# Patient Record
Sex: FEMALE | Race: BLACK OR AFRICAN AMERICAN | NOT HISPANIC OR LATINO | Employment: UNEMPLOYED | URBAN - NONMETROPOLITAN AREA
[De-identification: names, ages, dates, MRNs, and addresses within clinical notes are randomized per-mention and may not be internally consistent; named-entity substitution may affect disease eponyms.]

---

## 2017-01-20 ENCOUNTER — TELEPHONE (OUTPATIENT)
Dept: OBSTETRICS AND GYNECOLOGY | Facility: CLINIC | Age: 22
End: 2017-01-20

## 2017-01-20 ENCOUNTER — OFFICE VISIT (OUTPATIENT)
Dept: OBSTETRICS AND GYNECOLOGY | Facility: CLINIC | Age: 22
End: 2017-01-20

## 2017-01-20 VITALS
SYSTOLIC BLOOD PRESSURE: 112 MMHG | BODY MASS INDEX: 40.49 KG/M2 | WEIGHT: 258 LBS | HEART RATE: 104 BPM | DIASTOLIC BLOOD PRESSURE: 75 MMHG | HEIGHT: 67 IN

## 2017-01-20 DIAGNOSIS — N92.0 MENORRHAGIA WITH REGULAR CYCLE: Primary | ICD-10-CM

## 2017-01-20 DIAGNOSIS — N94.6 DYSMENORRHEA: ICD-10-CM

## 2017-01-20 DIAGNOSIS — Z30.016 ENCOUNTER FOR INITIAL PRESCRIPTION OF TRANSDERMAL PATCH HORMONAL CONTRACEPTIVE DEVICE: ICD-10-CM

## 2017-01-20 DIAGNOSIS — Z11.3 SCREEN FOR SEXUALLY TRANSMITTED DISEASES: ICD-10-CM

## 2017-01-20 PROCEDURE — 99203 OFFICE O/P NEW LOW 30 MIN: CPT | Performed by: NURSE PRACTITIONER

## 2017-01-20 RX ORDER — METRONIDAZOLE 500 MG/1
500 TABLET ORAL 2 TIMES DAILY
Qty: 14 TABLET | Refills: 0 | Status: SHIPPED | OUTPATIENT
Start: 2017-01-20 | End: 2017-01-27

## 2017-01-20 RX ORDER — FLUCONAZOLE 150 MG/1
TABLET ORAL
Qty: 2 TABLET | Refills: 0 | Status: SHIPPED | OUTPATIENT
Start: 2017-01-20

## 2017-01-20 NOTE — PROGRESS NOTES
Subjective   Moni Bianchi is a 21 y.o. G0 here with her foster guardian, Elida, to have an annual exam. She's not had a pap smear of exam in several years and is concerned about vaginal itching and odor, as well as having heavy periods that are uncomfortable. She has developmental delays    Gynecologic Exam   The patient's primary symptoms include genital itching, a genital odor and vaginal discharge. The patient's pertinent negatives include no genital lesions, genital rash, missed menses, pelvic pain or vaginal bleeding. This is a recurrent problem. The current episode started more than 1 month ago. The problem occurs intermittently. The problem has been unchanged. The patient is experiencing no pain. Pertinent negatives include no abdominal pain, dysuria, headaches, nausea, rash or vomiting. The vaginal discharge was malodorous. There has been no bleeding. Nothing aggravates the symptoms. She has tried nothing for the symptoms. She is not sexually active (Has been in the past but not recently). No, her partner does not have an STD. She uses nothing for contraception. Her menstrual history has been regular. Her past medical history is significant for menorrhagia. There is no history of an abdominal surgery, a  section, an ectopic pregnancy, miscarriage, ovarian cysts, an STD, a terminated pregnancy or vaginosis.   Reports having a normal pap 2-3 years ago.  LMP ~1 month ago; cramping now but not bleeding yet.      The following portions of the patient's history were reviewed and updated as appropriate: allergies, current medications, past family history, past medical history, past social history, past surgical history and problem list.    Review of Systems   Respiratory: Negative for apnea, chest tightness and shortness of breath.    Cardiovascular: Negative for chest pain, palpitations and leg swelling.   Gastrointestinal: Negative for abdominal distention, abdominal pain, nausea and vomiting.    Genitourinary: Positive for menorrhagia, menstrual problem and vaginal discharge. Negative for dysuria, genital sores, missed menses, pelvic pain, vaginal bleeding and vaginal pain.   Skin: Negative for color change and rash.   Neurological: Negative for weakness, light-headedness and headaches.   Psychiatric/Behavioral: Negative for dysphoric mood. The patient is nervous/anxious.        Objective   Physical Exam   Constitutional: She is oriented to person, place, and time. She appears well-developed and well-nourished.   Cardiovascular: Normal rate, regular rhythm, normal heart sounds and intact distal pulses.    Pulmonary/Chest: Effort normal and breath sounds normal.       Fibrocystic changes noted bilaterally   Abdominal: Soft. Bowel sounds are normal. She exhibits no distension. There is no tenderness.   Genitourinary: Uterus normal. No labial fusion. There is no rash, tenderness, lesion or injury on the right labia. There is no rash, tenderness, lesion or injury on the left labia. Cervix exhibits discharge. Cervix exhibits no motion tenderness and no friability. Right adnexum displays no mass, no tenderness and no fullness. Left adnexum displays no mass, no tenderness and no fullness. No erythema, tenderness or bleeding in the vagina. No foreign body in the vagina. No signs of injury around the vagina. Vaginal discharge found.   Genitourinary Comments: Vag panel and Gc/Ct swabs obtained.   Lymphadenopathy:     She has no axillary adenopathy.        Right: No inguinal adenopathy present.        Left: No inguinal adenopathy present.   Neurological: She is alert and oriented to person, place, and time.   Skin: Skin is warm, dry and intact.   Psychiatric: Her speech is normal and behavior is normal. Thought content normal. Her mood appears anxious. Her affect is not angry, not blunt, not labile and not inappropriate. She does not exhibit a depressed mood.   Nursing note and vitals  reviewed.        Assessment/Plan   Moni was seen today for gynecologic exam.    Diagnoses and all orders for this visit:    Screen for sexually transmitted diseases  -     Chlamydia / GC, DNA Probe  -     Vaginitis / Vaginosis DNA Probe    Other orders  -     norelgestromin-ethinyl estradiol (ORTHO EVRA) 150-35 MCG/24HR; Place 1 patch on the skin every 7 days for 3 weeks then off for 1 week.  -     metroNIDAZOLE (FLAGYL) 500 MG tablet; Take 1 tablet by mouth 2 (Two) Times a Day for 7 days.      She'll follow up on her Xulane patches in 3 months. She'll need a pap smear at that time.

## 2017-01-20 NOTE — MR AVS SNAPSHOT
Moni Bianchi   1/20/2017 11:15 AM   Office Visit    Dept Phone:  [phone]   Encounter #:  [id number]    Provider:  VARSHA Liu   Department:  [facility]                Your Full Care Plan              Today's Medication Changes          These changes are accurate as of: 1/20/17 12:06 PM.  If you have any questions, ask your nurse or doctor.               New Medication(s)Ordered:     metroNIDAZOLE 500 MG tablet   Commonly known as:  FLAGYL   Take 1 tablet by mouth 2 (Two) Times a Day for 7 days.   Started by:  VARSHA Liu       norelgestromin-ethinyl estradiol 150-35 MCG/24HR   Commonly known as:  ORTHO EVRA   Place 1 patch on the skin every 7 days for 3 weeks then off for 1 week.   Started by:  VARSHA Liu            Where to Get Your Medications      These medications were sent to [facility] - [address] - [address] [place] [address] [phone] [place] [id number]   [address] [place] [id number]     Phone:  [phone]     metroNIDAZOLE 500 MG tablet    norelgestromin-ethinyl estradiol 150-35 MCG/24HR                  Your Updated Medication List          This list is accurate as of: 1/20/17 12:06 PM.  Always use your most recent med list.                metroNIDAZOLE 500 MG tablet   Commonly known as:  FLAGYL   Take 1 tablet by mouth 2 (Two) Times a Day for 7 days.       norelgestromin-ethinyl estradiol 150-35 MCG/24HR   Commonly known as:  ORTHO EVRA   Place 1 patch on the skin every 7 days for 3 weeks then off for 1 week.               We Performed the Following     Chlamydia / GC, DNA Probe     Vaginitis / Vaginosis DNA Probe       You Were Diagnosed With        Codes Comments    Screen for sexually transmitted diseases    -  Primary ICD-10-CM: Z11.3  ICD-9-CM: V74.5       Instructions     None    Patient Instructions History      Upcoming Appointments     Visit Type Date Time Department    NEW GYNECOLOGICAL  "1/20/2017 11:15 AM [facility]    OFFICE VISIT 4/20/2017  2:30 PM [facility]      MyChart Signup     Our [facility] that [facility]. If you would like to sign up for [facility], please email [email or web address] or call [phone] to obtain an activation code.             Other Info from Your Visit           Your Appointments     Apr 20, 2017  2:30 PM CDT   Office Visit with VARSHA Liu   [facility] (--)    [address] [place] [address] [address]  [facility] [address] [place] [id number] [phone]           Arrive 15 minutes prior to appointment.              Allergies     No Known Allergies      Reason for Visit     Gynecologic Exam newpt      Vital Signs     Blood Pressure Pulse Height Weight Last Menstrual Period Breastfeeding?    112/75 104 67\" (170.2 cm) 258 lb (117 kg) 01/06/2017 (Approximate) No    Body Mass Index Smoking Status                40.41 kg/m2 Never Smoker          Problems and Diagnoses Noted     Screening for STDs (sexually transmitted diseases)    -  Primary        "